# Patient Record
Sex: FEMALE
[De-identification: names, ages, dates, MRNs, and addresses within clinical notes are randomized per-mention and may not be internally consistent; named-entity substitution may affect disease eponyms.]

---
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
## 2023-11-27 NOTE — PROCEDURE: ISOTRETINOIN MONITORING
Addended by: Erika Fitzpatrick on: 11/27/2023 03:23 PM     Modules accepted: Orders Patient Weight (Optional But Required For Cumulative Dose-Numbers And Decimals Only): 155

## 2024-05-22 NOTE — PROCEDURE: ORDER TESTS
Problem:  Fall Injury Risk  Goal: Absence of Fall, Infant Drop and Related Injury  Outcome: Progressing     Problem: Adult Inpatient Plan of Care  Goal: Plan of Care Review  Outcome: Progressing  Goal: Patient-Specific Goal (Individualized)  Outcome: Progressing  Goal: Absence of Hospital-Acquired Illness or Injury  Outcome: Progressing  Goal: Optimal Comfort and Wellbeing  Outcome: Progressing  Goal: Readiness for Transition of Care  Outcome: Progressing     Problem:  Labor  Goal: Delayed  Delivery  Outcome: Progressing      Expected Date Of Service: 12/12/2019